# Patient Record
Sex: FEMALE | Race: WHITE | NOT HISPANIC OR LATINO | URBAN - METROPOLITAN AREA
[De-identification: names, ages, dates, MRNs, and addresses within clinical notes are randomized per-mention and may not be internally consistent; named-entity substitution may affect disease eponyms.]

---

## 2025-01-14 ENCOUNTER — TELEPHONE (OUTPATIENT)
Dept: PRIMARY CARE | Facility: CLINIC | Age: 85
End: 2025-01-14
Payer: MEDICARE

## 2025-01-14 ENCOUNTER — TELEPHONE (OUTPATIENT)
Dept: POST ACUTE CARE | Facility: EXTERNAL LOCATION | Age: 85
End: 2025-01-14
Payer: MEDICARE

## 2025-01-14 DIAGNOSIS — N39.0 E-COLI UTI: Primary | ICD-10-CM

## 2025-01-14 DIAGNOSIS — B96.20 E-COLI UTI: Primary | ICD-10-CM

## 2025-01-14 RX ORDER — CALCIUM CARBONATE 200(500)MG
2 TABLET,CHEWABLE ORAL 4 TIMES DAILY PRN
COMMUNITY

## 2025-01-14 RX ORDER — ASPIRIN 81 MG/1
81 TABLET ORAL DAILY
COMMUNITY

## 2025-01-14 RX ORDER — MELATONIN 5 MG
1 CAPSULE ORAL DAILY
COMMUNITY

## 2025-01-14 RX ORDER — DONEPEZIL HYDROCHLORIDE 5 MG/1
5 TABLET, FILM COATED ORAL DAILY
COMMUNITY

## 2025-01-14 RX ORDER — FERROUS SULFATE 325(65) MG
325 TABLET ORAL
COMMUNITY

## 2025-01-14 RX ORDER — CRANBERRY FRUIT 450 MG
1 TABLET ORAL DAILY
COMMUNITY

## 2025-01-14 RX ORDER — VENLAFAXINE 75 MG/1
75 TABLET ORAL DAILY
COMMUNITY

## 2025-01-14 RX ORDER — BISMUTH SUBSALICYLATE 262 MG
1 TABLET,CHEWABLE ORAL DAILY
COMMUNITY

## 2025-01-14 RX ORDER — SULFAMETHOXAZOLE AND TRIMETHOPRIM 800; 160 MG/1; MG/1
1 TABLET ORAL 2 TIMES DAILY
Qty: 14 TABLET | Refills: 0 | Status: SHIPPED | OUTPATIENT
Start: 2025-01-14 | End: 2025-01-21

## 2025-01-14 RX ORDER — PUMPKIN SEED EXTRACT/SOY GERM 300 MG
1 CAPSULE ORAL DAILY
COMMUNITY

## 2025-01-14 RX ORDER — FUROSEMIDE 20 MG/1
20 TABLET ORAL DAILY
COMMUNITY

## 2025-01-14 RX ORDER — CHOLECALCIFEROL (VITAMIN D3) 25 MCG
2000 TABLET ORAL DAILY
COMMUNITY

## 2025-01-14 RX ORDER — PSYLLIUM HUSK 0.4 G
1 CAPSULE ORAL 2 TIMES DAILY
COMMUNITY

## 2025-01-14 RX ORDER — TRAZODONE HYDROCHLORIDE 50 MG/1
50 TABLET ORAL NIGHTLY
COMMUNITY

## 2025-01-14 RX ORDER — SIMVASTATIN 20 MG/1
20 TABLET, FILM COATED ORAL NIGHTLY
COMMUNITY

## 2025-01-14 RX ORDER — POLYETHYLENE GLYCOL 3350, SODIUM CHLORIDE, SODIUM BICARBONATE, POTASSIUM CHLORIDE 420; 11.2; 5.72; 1.48 G/4L; G/4L; G/4L; G/4L
4000 POWDER, FOR SOLUTION ORAL DAILY PRN
COMMUNITY

## 2025-01-14 RX ORDER — ACETAMINOPHEN 325 MG/1
650 TABLET ORAL EVERY 6 HOURS PRN
COMMUNITY

## 2025-01-14 NOTE — TELEPHONE ENCOUNTER
Received faxed documents from [facility] along with request for patient to be seen by SHAWNEE Mcdermott NP at facility.   Please see patient when next at facility.

## 2025-01-14 NOTE — TELEPHONE ENCOUNTER
Urine culture reviewed with Violet ESCOTO at [facility]. Pt with UTI  with ecoli. Sensitivity reviewed. Will send Bactrim DS to [facility] pharmacy. Allergies and recent labs reviewed.

## 2025-01-16 ENCOUNTER — NURSING HOME VISIT (OUTPATIENT)
Dept: POST ACUTE CARE | Facility: EXTERNAL LOCATION | Age: 85
End: 2025-01-16
Payer: MEDICARE

## 2025-01-16 DIAGNOSIS — F01.50 VASCULAR DEMENTIA WITHOUT BEHAVIORAL DISTURBANCE, PSYCHOTIC DISTURBANCE, MOOD DISTURBANCE, OR ANXIETY, UNSPECIFIED DEMENTIA SEVERITY (MULTI): ICD-10-CM

## 2025-01-16 DIAGNOSIS — F41.1 GENERALIZED ANXIETY DISORDER: ICD-10-CM

## 2025-01-16 DIAGNOSIS — D50.9 IRON DEFICIENCY ANEMIA, UNSPECIFIED IRON DEFICIENCY ANEMIA TYPE: ICD-10-CM

## 2025-01-16 DIAGNOSIS — Z78.9 LIVING IN ASSISTED LIVING: ICD-10-CM

## 2025-01-16 DIAGNOSIS — E78.2 MIXED HYPERLIPIDEMIA: Primary | ICD-10-CM

## 2025-01-16 PROCEDURE — 99344 HOME/RES VST NEW MOD MDM 60: CPT

## 2025-01-16 ASSESSMENT — PAIN SCALES - GENERAL: PAINLEVEL_OUTOF10: 0-NO PAIN

## 2025-01-16 NOTE — LETTER
"Patient: Gifty Zavala  : 1940    Encounter Date: 2025    Subjective  Patient ID: Gifty Zavala is a 84 y.o. female who is assisted living/ home patient being seen at [facility],  and evaluated to Establish Care.     HPI   Pt visited in assisted living to establish care. Pt with a hx of hyperlipidemia, iron deficiency anemia, dementia, and anxiety. Pt oriented x1, unable to state , denies pain and is comfortable. Pt denies changes in vision, and hearing. Appears to hear normal tone of voice. Pt states appetite is good, and denies difficulty with chewing and swallowing. Pt goes to dining room for all meals. Pt denies headaches, dizziness, congestion, cough and runny nose. Pt with a hx of skin cancer.    Pt denies chest pain and sob at rest/exertion. Pt denies voiding symptoms and constipation. However, pt with a positive urine culture with E-coli. Pt was started on Bactrim, and appears to be doing well, no adverse effects, or side effects have been noted. Pt ambulates independently. No recent falls reported. Denies sleep disturbances. Pt follows mental health provider for anxiety/depression. Pt able to answer yes and no questions regarding health. States she moved from [place] when she was younger. , and has 2 children. Pt repeats conversation, or asks same questions over at times. Pt states \"I really am doing good\". HPI limited due to cognition secondary to dementia. Additional collateral information obtained from nursing, and daughter Татьяна.     Current Outpatient Medications on File Prior to Visit   Medication Sig Dispense Refill   • acetaminophen (Tylenol) 325 mg tablet Take 2 tablets (650 mg) by mouth every 6 hours if needed for mild pain (1 - 3).     • aspirin 81 mg EC tablet Take 1 tablet (81 mg) by mouth once daily.     • calcium carbonate (Tums) 200 mg calcium chewable tablet Chew 2 tablets (1,000 mg) 4 times a day as needed for indigestion or heartburn (1-2 tablets).     • " calcium carbonate-vitamin D3 500 mg-5 mcg (200 unit) tablet Take 1 tablet by mouth 2 times a day.     • cholecalciferol (Vitamin D3) 25 MCG (1000 UT) tablet Take 2 tablets (2,000 Units) by mouth once daily.     • cranberry fruit (cranberry) 450 mg tablet Take 1 capsule by mouth once daily. At bedtime     • d-mannose (AZO D-Mannose) 500 mg capsule Take 1 capsule (500 mg) by mouth once daily. At bedtime     • donepezil (Aricept) 5 mg tablet Take 1 tablet (5 mg) by mouth once daily. After breakfast     • ferrous sulfate, 325 mg ferrous sulfate, tablet Take 1 tablet (325 mg) by mouth once daily with breakfast.     • furosemide (Lasix) 20 mg tablet Take 1 tablet (20 mg) by mouth once daily.     • melatonin 5 mg capsule Take 1 capsule (5 mg) by mouth once daily. At bedtime     • multivitamin tablet Take 1 tablet by mouth once daily.     • polyethylene glycol-electrolytes (Nulytely) 420 gram solution Take 4,000 mL by mouth once daily as needed. For constipation     • simvastatin (Zocor) 20 mg tablet Take 1 tablet (20 mg) by mouth once daily at bedtime.     • sulfamethoxazole-trimethoprim (Bactrim DS) 800-160 mg tablet Take 1 tablet by mouth 2 times a day for 7 days. 14 tablet 0   • traZODone (Desyrel) 50 mg tablet Take 1 tablet (50 mg) by mouth once daily at bedtime.     • venlafaxine (Effexor) 75 mg tablet Take 1 tablet (75 mg) by mouth once daily.       No current facility-administered medications on file prior to visit.      Review of Systems   Constitutional:  Negative for chills, fatigue and fever.   HENT:  Negative for congestion and rhinorrhea.    Respiratory:  Negative for cough and shortness of breath.    Cardiovascular:  Negative for chest pain and leg swelling.   Gastrointestinal:  Negative for constipation and diarrhea.   Genitourinary:  Negative for frequency.   Musculoskeletal:  Negative for arthralgias and gait problem.   Neurological:  Negative for weakness and headaches.   Psychiatric/Behavioral:  Negative  for sleep disturbance. The patient is not nervous/anxious.        Objective  /68 (BP Location: Left arm, Patient Position: Sitting, BP Cuff Size: Adult)   Pulse 72   Resp 16   SpO2 97%     *Outside labs reviewed from [facility] on 11/23/24: CMP, CBC, Ferritin, Iron and TIBC, and Lipid Panel.    Physical Exam  Constitutional:       General: She is awake.      Appearance: Normal appearance.   HENT:      Head: Normocephalic.      Nose: No congestion or rhinorrhea.      Mouth/Throat:      Mouth: Mucous membranes are moist.   Eyes:      Conjunctiva/sclera: Conjunctivae normal.   Cardiovascular:      Rate and Rhythm: Normal rate and regular rhythm.      Pulses: Normal pulses.      Heart sounds: Normal heart sounds.   Pulmonary:      Effort: Pulmonary effort is normal. No respiratory distress.      Breath sounds: Normal breath sounds. No wheezing or rhonchi.   Abdominal:      General: Bowel sounds are normal.      Palpations: Abdomen is soft.   Musculoskeletal:         General: Normal range of motion.      Cervical back: Normal range of motion.      Right lower leg: No edema.      Left lower leg: No edema.   Skin:     General: Skin is warm.      Capillary Refill: Capillary refill takes less than 2 seconds.   Neurological:      Mental Status: She is alert. Mental status is at baseline.      Comments: Oriented x1   Psychiatric:         Attention and Perception: Attention normal.         Behavior: Behavior is cooperative.         Cognition and Memory: Cognition is impaired. Memory is impaired.         Assessment/Plan  Diagnoses and all orders for this visit:  Mixed hyperlipidemia  Comments:  Continue simvastatin.  Generalized anxiety disorder  Comments:  Continue to follow psych for symptom and medication mgmt.  Vascular dementia without behavioral disturbance, psychotic disturbance, mood disturbance, or anxiety, unspecified dementia severity (Multi)  Comments:  Continue to monitor for cognitive decline. Continue  donepezil.  Iron deficiency anemia, unspecified iron deficiency anemia type  Comments:  Continue ferrous sulfate. Recheck iron in 3 months.  Living in assisted living  Comments:  Medical and facility chart reviewed, medications reconciled and collaboration with nursing.            Electronically Signed By: CARLO Travis-CNP   1/17/25  7:04 PM

## 2025-01-17 VITALS
HEART RATE: 72 BPM | RESPIRATION RATE: 16 BRPM | OXYGEN SATURATION: 97 % | DIASTOLIC BLOOD PRESSURE: 68 MMHG | SYSTOLIC BLOOD PRESSURE: 120 MMHG

## 2025-01-17 PROBLEM — G47.09 OTHER INSOMNIA: Status: ACTIVE | Noted: 2024-08-28

## 2025-01-17 PROBLEM — E78.5 HYPERLIPIDEMIA: Status: ACTIVE | Noted: 2025-01-17

## 2025-01-17 PROBLEM — F03.90 DEMENTIA: Status: ACTIVE | Noted: 2024-08-28

## 2025-01-17 PROBLEM — F41.1 GENERALIZED ANXIETY DISORDER: Status: ACTIVE | Noted: 2024-08-28

## 2025-01-17 PROBLEM — F33.40 RECURRENT MAJOR DEPRESSIVE DISORDER, IN REMISSION (CMS-HCC): Chronic | Status: ACTIVE | Noted: 2024-08-28

## 2025-01-17 ASSESSMENT — ENCOUNTER SYMPTOMS
CONSTIPATION: 0
DIARRHEA: 0
HEADACHES: 0
SHORTNESS OF BREATH: 0
FREQUENCY: 0
WEAKNESS: 0
CHILLS: 0
RHINORRHEA: 0
ARTHRALGIAS: 0
COUGH: 0
FEVER: 0
FATIGUE: 0
NERVOUS/ANXIOUS: 0
SLEEP DISTURBANCE: 0

## 2025-01-17 NOTE — PROGRESS NOTES
"Subjective   Patient ID: Gifty Zavala is a 84 y.o. female who is assisted living/ home patient being seen at [facility],  and evaluated to Establish Care.     HPI   Pt visited in assisted living to establish care. Pt with a hx of hyperlipidemia, iron deficiency anemia, dementia, and anxiety. Pt oriented x1, unable to state , denies pain and is comfortable. Pt denies changes in vision, and hearing. Appears to hear normal tone of voice. Pt states appetite is good, and denies difficulty with chewing and swallowing. Pt goes to dining room for all meals. Pt denies headaches, dizziness, congestion, cough and runny nose. Pt with a hx of skin cancer.    Pt denies chest pain and sob at rest/exertion. Pt denies voiding symptoms and constipation. However, pt with a positive urine culture with E-coli. Pt was started on Bactrim, and appears to be doing well, no adverse effects, or side effects have been noted. Pt ambulates independently. No recent falls reported. Denies sleep disturbances. Pt follows mental health provider for anxiety/depression. Pt able to answer yes and no questions regarding health. States she moved from [place] when she was younger. , and has 2 children. Pt repeats conversation, or asks same questions over at times. Pt states \"I really am doing good\". HPI limited due to cognition secondary to dementia. Additional collateral information obtained from nursing, and daughter Татьяна.     Current Outpatient Medications on File Prior to Visit   Medication Sig Dispense Refill    acetaminophen (Tylenol) 325 mg tablet Take 2 tablets (650 mg) by mouth every 6 hours if needed for mild pain (1 - 3).      aspirin 81 mg EC tablet Take 1 tablet (81 mg) by mouth once daily.      calcium carbonate (Tums) 200 mg calcium chewable tablet Chew 2 tablets (1,000 mg) 4 times a day as needed for indigestion or heartburn (1-2 tablets).      calcium carbonate-vitamin D3 500 mg-5 mcg (200 unit) tablet Take 1 tablet by " mouth 2 times a day.      cholecalciferol (Vitamin D3) 25 MCG (1000 UT) tablet Take 2 tablets (2,000 Units) by mouth once daily.      cranberry fruit (cranberry) 450 mg tablet Take 1 capsule by mouth once daily. At bedtime      d-mannose (AZO D-Mannose) 500 mg capsule Take 1 capsule (500 mg) by mouth once daily. At bedtime      donepezil (Aricept) 5 mg tablet Take 1 tablet (5 mg) by mouth once daily. After breakfast      ferrous sulfate, 325 mg ferrous sulfate, tablet Take 1 tablet (325 mg) by mouth once daily with breakfast.      furosemide (Lasix) 20 mg tablet Take 1 tablet (20 mg) by mouth once daily.      melatonin 5 mg capsule Take 1 capsule (5 mg) by mouth once daily. At bedtime      multivitamin tablet Take 1 tablet by mouth once daily.      polyethylene glycol-electrolytes (Nulytely) 420 gram solution Take 4,000 mL by mouth once daily as needed. For constipation      simvastatin (Zocor) 20 mg tablet Take 1 tablet (20 mg) by mouth once daily at bedtime.      sulfamethoxazole-trimethoprim (Bactrim DS) 800-160 mg tablet Take 1 tablet by mouth 2 times a day for 7 days. 14 tablet 0    traZODone (Desyrel) 50 mg tablet Take 1 tablet (50 mg) by mouth once daily at bedtime.      venlafaxine (Effexor) 75 mg tablet Take 1 tablet (75 mg) by mouth once daily.       No current facility-administered medications on file prior to visit.      Review of Systems   Constitutional:  Negative for chills, fatigue and fever.   HENT:  Negative for congestion and rhinorrhea.    Respiratory:  Negative for cough and shortness of breath.    Cardiovascular:  Negative for chest pain and leg swelling.   Gastrointestinal:  Negative for constipation and diarrhea.   Genitourinary:  Negative for frequency.   Musculoskeletal:  Negative for arthralgias and gait problem.   Neurological:  Negative for weakness and headaches.   Psychiatric/Behavioral:  Negative for sleep disturbance. The patient is not nervous/anxious.        Objective   /68  (BP Location: Left arm, Patient Position: Sitting, BP Cuff Size: Adult)   Pulse 72   Resp 16   SpO2 97%     *Outside labs reviewed from [facility] on 11/23/24: CMP, CBC, Ferritin, Iron and TIBC, and Lipid Panel.    Physical Exam  Constitutional:       General: She is awake.      Appearance: Normal appearance.   HENT:      Head: Normocephalic.      Nose: No congestion or rhinorrhea.      Mouth/Throat:      Mouth: Mucous membranes are moist.   Eyes:      Conjunctiva/sclera: Conjunctivae normal.   Cardiovascular:      Rate and Rhythm: Normal rate and regular rhythm.      Pulses: Normal pulses.      Heart sounds: Normal heart sounds.   Pulmonary:      Effort: Pulmonary effort is normal. No respiratory distress.      Breath sounds: Normal breath sounds. No wheezing or rhonchi.   Abdominal:      General: Bowel sounds are normal.      Palpations: Abdomen is soft.   Musculoskeletal:         General: Normal range of motion.      Cervical back: Normal range of motion.      Right lower leg: No edema.      Left lower leg: No edema.   Skin:     General: Skin is warm.      Capillary Refill: Capillary refill takes less than 2 seconds.   Neurological:      Mental Status: She is alert. Mental status is at baseline.      Comments: Oriented x1   Psychiatric:         Attention and Perception: Attention normal.         Behavior: Behavior is cooperative.         Cognition and Memory: Cognition is impaired. Memory is impaired.         Assessment/Plan   Diagnoses and all orders for this visit:  Mixed hyperlipidemia  Comments:  Continue simvastatin.  Generalized anxiety disorder  Comments:  Continue to follow psych for symptom and medication mgmt.  Vascular dementia without behavioral disturbance, psychotic disturbance, mood disturbance, or anxiety, unspecified dementia severity (Multi)  Comments:  Continue to monitor for cognitive decline. Continue donepezil.  Iron deficiency anemia, unspecified iron deficiency anemia type  Comments:  Continue  ferrous sulfate. Recheck iron in 3 months.  Living in assisted living  Comments:  Medical and facility chart reviewed, medications reconciled and collaboration with nursing.

## 2025-03-18 DIAGNOSIS — Z78.9 TAKES DIETARY SUPPLEMENTS: ICD-10-CM

## 2025-03-18 DIAGNOSIS — E78.2 MIXED HYPERLIPIDEMIA: Primary | ICD-10-CM

## 2025-03-18 DIAGNOSIS — I25.10 CARDIOVASCULAR DISEASE: ICD-10-CM

## 2025-03-18 DIAGNOSIS — R03.0 ELEVATED BP WITHOUT DIAGNOSIS OF HYPERTENSION: ICD-10-CM

## 2025-03-18 RX ORDER — SIMVASTATIN 20 MG/1
20 TABLET, FILM COATED ORAL NIGHTLY
Qty: 90 TABLET | Refills: 3 | Status: SHIPPED | OUTPATIENT
Start: 2025-03-18

## 2025-03-18 RX ORDER — FUROSEMIDE 20 MG/1
20 TABLET ORAL DAILY
Qty: 90 TABLET | Refills: 3 | Status: SHIPPED | OUTPATIENT
Start: 2025-03-18

## 2025-03-18 RX ORDER — ASPIRIN 81 MG/1
81 TABLET ORAL DAILY
Qty: 90 TABLET | Refills: 3 | Status: SHIPPED | OUTPATIENT
Start: 2025-03-18

## 2025-03-18 RX ORDER — BISMUTH SUBSALICYLATE 262 MG
1 TABLET,CHEWABLE ORAL DAILY
Qty: 90 TABLET | Refills: 3 | Status: SHIPPED | OUTPATIENT
Start: 2025-03-18

## 2025-03-18 RX ORDER — PSYLLIUM HUSK 0.4 G
1 CAPSULE ORAL 2 TIMES DAILY
Qty: 180 TABLET | Refills: 3 | Status: SHIPPED | OUTPATIENT
Start: 2025-03-18

## 2025-03-18 NOTE — TELEPHONE ENCOUNTER
Violet ESCOTO calls, pt needing refills of medication. Explained could fill all but Trazodone and Melatonin, these were prescribed by Psych NP Holli Sanchez.

## 2025-04-10 ENCOUNTER — NURSING HOME VISIT (OUTPATIENT)
Dept: POST ACUTE CARE | Facility: EXTERNAL LOCATION | Age: 85
End: 2025-04-10
Payer: MEDICARE

## 2025-04-10 DIAGNOSIS — Z78.9 LIVING IN ASSISTED LIVING: ICD-10-CM

## 2025-04-10 DIAGNOSIS — F41.1 GENERALIZED ANXIETY DISORDER: ICD-10-CM

## 2025-04-10 DIAGNOSIS — E78.2 MIXED HYPERLIPIDEMIA: ICD-10-CM

## 2025-04-10 DIAGNOSIS — F01.50 VASCULAR DEMENTIA WITHOUT BEHAVIORAL DISTURBANCE, PSYCHOTIC DISTURBANCE, MOOD DISTURBANCE, OR ANXIETY, UNSPECIFIED DEMENTIA SEVERITY (MULTI): Primary | ICD-10-CM

## 2025-04-10 DIAGNOSIS — D50.9 IRON DEFICIENCY ANEMIA, UNSPECIFIED IRON DEFICIENCY ANEMIA TYPE: ICD-10-CM

## 2025-04-10 PROCEDURE — 99349 HOME/RES VST EST MOD MDM 40: CPT

## 2025-04-10 ASSESSMENT — PAIN SCALES - GENERAL: PAINLEVEL_OUTOF10: 0-NO PAIN

## 2025-04-10 NOTE — LETTER
"Patient: Gifty Zavala  : 1940    Encounter Date: 04/10/2025    Subjective  Patient ID: Gifty Zavala is a 84 y.o. female who is assisted living/ home patient being seen at [facility], and evaluated for Routine AL Visit.     HPI   Pt visited in apartment of assisted living where she resides with cat named Robert. Pt oriented x1, unable to state , denies pain and is comfortable. Pt denies changes in vision, and hearing. Appears to hear normal tone of voice. States appetite is good, and denies difficulty with chewing and swallowing. Pt goes to dining room for all meals. Pt denies headaches, dizziness, congestion, cough and runny nose.     Pt denies chest pain and sob at rest/exertion. Pt denies voiding symptoms and constipation. Pt ambulates independently. No recent falls reported. Denies sleep disturbances. Pt follows mental health provider for anxiety/depression. Pt answer questions appropriately at this time and states \"yes dear, and no dear\". Pt enjoys participating in activities at assisted living. Pt states \"I do pretty good, I feel good\". Pt with no health concerns at this time. HPI limited due to cognition secondary to dementia. Additional collateral information obtained from nursing.       Current Outpatient Medications on File Prior to Visit   Medication Sig Dispense Refill   • clobetasol (Temovate) 0.05 % cream Apply 1 Application topically 2 times a day.     • acetaminophen (Tylenol) 325 mg tablet Take 2 tablets (650 mg) by mouth every 6 hours if needed for mild pain (1 - 3).     • aspirin 81 mg EC tablet Take 1 tablet (81 mg) by mouth once daily. 90 tablet 3   • calcium carbonate (Tums) 200 mg calcium chewable tablet Chew 2 tablets (1,000 mg) 4 times a day as needed for indigestion or heartburn (1-2 tablets).     • calcium carbonate-vitamin D3 500 mg-5 mcg (200 unit) tablet Take 1 tablet by mouth 2 times a day. 180 tablet 3   • cholecalciferol (Vitamin D3) 25 MCG (1000 UT) tablet Take 2 " tablets (2,000 Units) by mouth once daily.     • cranberry fruit (cranberry) 450 mg tablet Take 1 capsule by mouth once daily. At bedtime     • d-mannose (AZO D-Mannose) 500 mg capsule Take 1 capsule (500 mg) by mouth once daily. At bedtime     • donepezil (Aricept) 5 mg tablet Take 1 tablet (5 mg) by mouth once daily. After breakfast     • ferrous sulfate, 325 mg ferrous sulfate, tablet Take 1 tablet (325 mg) by mouth once daily with breakfast.     • furosemide (Lasix) 20 mg tablet Take 1 tablet (20 mg) by mouth once daily. 90 tablet 3   • melatonin 5 mg capsule Take 1 capsule (5 mg) by mouth once daily. At bedtime     • multivitamin tablet Take 1 tablet by mouth once daily. 90 tablet 3   • polyethylene glycol-electrolytes (Nulytely) 420 gram solution Take 4,000 mL by mouth once daily as needed. For constipation     • simvastatin (Zocor) 20 mg tablet Take 1 tablet (20 mg) by mouth once daily at bedtime. 90 tablet 3   • traZODone (Desyrel) 50 mg tablet Take 1 tablet (50 mg) by mouth once daily at bedtime.     • venlafaxine (Effexor) 75 mg tablet Take 1 tablet (75 mg) by mouth once daily.       No current facility-administered medications on file prior to visit.      Review of Systems   Constitutional:  Negative for chills, fatigue and fever.   HENT:  Negative for congestion, rhinorrhea and sore throat.    Respiratory:  Negative for cough and shortness of breath.    Cardiovascular:  Negative for chest pain and leg swelling.   Gastrointestinal:  Negative for constipation, diarrhea, nausea and vomiting.   Genitourinary:  Negative for frequency.   Musculoskeletal:  Negative for arthralgias and gait problem.   Neurological:  Negative for dizziness, weakness and headaches.   Psychiatric/Behavioral:  Negative for sleep disturbance. The patient is not nervous/anxious.        Objective  /88   Pulse 82   Resp 16   SpO2 96%     *CMP, CBC, Lipid Panel, Ferritin, Iron and TIBC reviewed from 11/23/2024.     Physical  Exam  Constitutional:       General: She is awake.      Appearance: Normal appearance.   HENT:      Head: Normocephalic.      Nose: No congestion or rhinorrhea.      Mouth/Throat:      Mouth: Mucous membranes are moist.   Eyes:      Conjunctiva/sclera: Conjunctivae normal.   Cardiovascular:      Rate and Rhythm: Normal rate and regular rhythm.      Pulses: Normal pulses.      Heart sounds: Normal heart sounds.   Pulmonary:      Effort: Pulmonary effort is normal. No respiratory distress.      Breath sounds: Normal breath sounds. No wheezing or rhonchi.   Abdominal:      General: Bowel sounds are normal.      Palpations: Abdomen is soft.   Musculoskeletal:         General: Normal range of motion.      Cervical back: Normal range of motion.      Right lower leg: No edema.      Left lower leg: No edema.   Skin:     General: Skin is warm.      Capillary Refill: Capillary refill takes less than 2 seconds.   Neurological:      Mental Status: She is alert. Mental status is at baseline.      Comments: Oriented x1   Psychiatric:         Attention and Perception: Attention normal.         Mood and Affect: Mood normal.         Speech: Speech normal.         Behavior: Behavior is cooperative.         Cognition and Memory: Cognition is impaired. Memory is impaired.         Assessment/Plan  Diagnoses and all orders for this visit:  Vascular dementia without behavioral disturbance, psychotic disturbance, mood disturbance, or anxiety, unspecified dementia severity (Multi)  Comments:  Continue to monitor for cognitive decline.  Iron deficiency anemia, unspecified iron deficiency anemia type  Comments:  Continue ferrous sulfate. Obtain CBC, Iron, and TIBC on 4/17/25.  Mixed hyperlipidemia  Comments:  Continue simvastatin. Lipid panel reviewed from 11/2024.  Generalized anxiety disorder  Comments:  Continue to follow psych for medication and symptom management.  Living in assisted living  Comments:  Medical and facility chart  reviewed, medications reconciled, and collaboration with nursing. Orders written for BMP, CBC, Iron/TIBC, and Vit D on 4/17/25.            Electronically Signed By: CARLO Travis-CNP   4/15/25  1:54 PM

## 2025-04-15 VITALS
HEART RATE: 82 BPM | OXYGEN SATURATION: 96 % | RESPIRATION RATE: 16 BRPM | DIASTOLIC BLOOD PRESSURE: 88 MMHG | SYSTOLIC BLOOD PRESSURE: 128 MMHG

## 2025-04-15 RX ORDER — CLOBETASOL PROPIONATE 0.5 MG/G
1 CREAM TOPICAL 2 TIMES DAILY
COMMUNITY
Start: 2024-03-04

## 2025-04-15 ASSESSMENT — ENCOUNTER SYMPTOMS
CONSTIPATION: 0
SLEEP DISTURBANCE: 0
CHILLS: 0
COUGH: 0
VOMITING: 0
FREQUENCY: 0
FEVER: 0
DIZZINESS: 0
WEAKNESS: 0
DIARRHEA: 0
SHORTNESS OF BREATH: 0
ARTHRALGIAS: 0
HEADACHES: 0
NAUSEA: 0
SORE THROAT: 0
FATIGUE: 0
RHINORRHEA: 0
NERVOUS/ANXIOUS: 0

## 2025-04-15 NOTE — PROGRESS NOTES
"Subjective   Patient ID: Gifty Zavala is a 84 y.o. female who is assisted living/ home patient being seen at [facility], and evaluated for Routine AL Visit.     HPI   Pt visited in apartment of assisted living where she resides with cat named Robert. Pt oriented x1, unable to state , denies pain and is comfortable. Pt denies changes in vision, and hearing. Appears to hear normal tone of voice. States appetite is good, and denies difficulty with chewing and swallowing. Pt goes to dining room for all meals. Pt denies headaches, dizziness, congestion, cough and runny nose.     Pt denies chest pain and sob at rest/exertion. Pt denies voiding symptoms and constipation. Pt ambulates independently. No recent falls reported. Denies sleep disturbances. Pt follows mental health provider for anxiety/depression. Pt answer questions appropriately at this time and states \"yes dear, and no dear\". Pt enjoys participating in activities at assisted living. Pt states \"I do pretty good, I feel good\". Pt with no health concerns at this time. HPI limited due to cognition secondary to dementia. Additional collateral information obtained from nursing.       Current Outpatient Medications on File Prior to Visit   Medication Sig Dispense Refill    clobetasol (Temovate) 0.05 % cream Apply 1 Application topically 2 times a day.      acetaminophen (Tylenol) 325 mg tablet Take 2 tablets (650 mg) by mouth every 6 hours if needed for mild pain (1 - 3).      aspirin 81 mg EC tablet Take 1 tablet (81 mg) by mouth once daily. 90 tablet 3    calcium carbonate (Tums) 200 mg calcium chewable tablet Chew 2 tablets (1,000 mg) 4 times a day as needed for indigestion or heartburn (1-2 tablets).      calcium carbonate-vitamin D3 500 mg-5 mcg (200 unit) tablet Take 1 tablet by mouth 2 times a day. 180 tablet 3    cholecalciferol (Vitamin D3) 25 MCG (1000 UT) tablet Take 2 tablets (2,000 Units) by mouth once daily.      cranberry fruit (cranberry) 450 " mg tablet Take 1 capsule by mouth once daily. At bedtime      d-mannose (AZO D-Mannose) 500 mg capsule Take 1 capsule (500 mg) by mouth once daily. At bedtime      donepezil (Aricept) 5 mg tablet Take 1 tablet (5 mg) by mouth once daily. After breakfast      ferrous sulfate, 325 mg ferrous sulfate, tablet Take 1 tablet (325 mg) by mouth once daily with breakfast.      furosemide (Lasix) 20 mg tablet Take 1 tablet (20 mg) by mouth once daily. 90 tablet 3    melatonin 5 mg capsule Take 1 capsule (5 mg) by mouth once daily. At bedtime      multivitamin tablet Take 1 tablet by mouth once daily. 90 tablet 3    polyethylene glycol-electrolytes (Nulytely) 420 gram solution Take 4,000 mL by mouth once daily as needed. For constipation      simvastatin (Zocor) 20 mg tablet Take 1 tablet (20 mg) by mouth once daily at bedtime. 90 tablet 3    traZODone (Desyrel) 50 mg tablet Take 1 tablet (50 mg) by mouth once daily at bedtime.      venlafaxine (Effexor) 75 mg tablet Take 1 tablet (75 mg) by mouth once daily.       No current facility-administered medications on file prior to visit.      Review of Systems   Constitutional:  Negative for chills, fatigue and fever.   HENT:  Negative for congestion, rhinorrhea and sore throat.    Respiratory:  Negative for cough and shortness of breath.    Cardiovascular:  Negative for chest pain and leg swelling.   Gastrointestinal:  Negative for constipation, diarrhea, nausea and vomiting.   Genitourinary:  Negative for frequency.   Musculoskeletal:  Negative for arthralgias and gait problem.   Neurological:  Negative for dizziness, weakness and headaches.   Psychiatric/Behavioral:  Negative for sleep disturbance. The patient is not nervous/anxious.        Objective   /88   Pulse 82   Resp 16   SpO2 96%     *CMP, CBC, Lipid Panel, Ferritin, Iron and TIBC reviewed from 11/23/2024.     Physical Exam  Constitutional:       General: She is awake.      Appearance: Normal appearance.   HENT:       Head: Normocephalic.      Nose: No congestion or rhinorrhea.      Mouth/Throat:      Mouth: Mucous membranes are moist.   Eyes:      Conjunctiva/sclera: Conjunctivae normal.   Cardiovascular:      Rate and Rhythm: Normal rate and regular rhythm.      Pulses: Normal pulses.      Heart sounds: Normal heart sounds.   Pulmonary:      Effort: Pulmonary effort is normal. No respiratory distress.      Breath sounds: Normal breath sounds. No wheezing or rhonchi.   Abdominal:      General: Bowel sounds are normal.      Palpations: Abdomen is soft.   Musculoskeletal:         General: Normal range of motion.      Cervical back: Normal range of motion.      Right lower leg: No edema.      Left lower leg: No edema.   Skin:     General: Skin is warm.      Capillary Refill: Capillary refill takes less than 2 seconds.   Neurological:      Mental Status: She is alert. Mental status is at baseline.      Comments: Oriented x1   Psychiatric:         Attention and Perception: Attention normal.         Mood and Affect: Mood normal.         Speech: Speech normal.         Behavior: Behavior is cooperative.         Cognition and Memory: Cognition is impaired. Memory is impaired.         Assessment/Plan   Diagnoses and all orders for this visit:  Vascular dementia without behavioral disturbance, psychotic disturbance, mood disturbance, or anxiety, unspecified dementia severity (Multi)  Comments:  Continue to monitor for cognitive decline.  Iron deficiency anemia, unspecified iron deficiency anemia type  Comments:  Continue ferrous sulfate. Obtain CBC, Iron, and TIBC on 4/17/25.  Mixed hyperlipidemia  Comments:  Continue simvastatin. Lipid panel reviewed from 11/2024.  Generalized anxiety disorder  Comments:  Continue to follow psych for medication and symptom management.  Living in assisted living  Comments:  Medical and facility chart reviewed, medications reconciled, and collaboration with nursing. Orders written for BMP, CBC,  Iron/TIBC, and Vit D on 4/17/25.

## 2025-04-24 DIAGNOSIS — F51.01 PRIMARY INSOMNIA: Primary | ICD-10-CM

## 2025-04-24 RX ORDER — TRAZODONE HYDROCHLORIDE 50 MG/1
50 TABLET ORAL NIGHTLY
Qty: 90 TABLET | Refills: 1 | Status: SHIPPED | OUTPATIENT
Start: 2025-04-24

## 2025-04-24 NOTE — TELEPHONE ENCOUNTER
Facility pharmacy requesting refill on Trazodone 50mg po once daily at bedtime. Med sent to [facility].

## 2025-05-27 DIAGNOSIS — E55.9 VITAMIN D DEFICIENCY: Primary | ICD-10-CM

## 2025-05-28 RX ORDER — CHOLECALCIFEROL (VITAMIN D3) 25 MCG
50 TABLET ORAL DAILY
Qty: 30 TABLET | Refills: 1 | Status: SHIPPED | OUTPATIENT
Start: 2025-05-28

## 2025-05-30 RX ORDER — CHOLECALCIFEROL (VITAMIN D3) 50 MCG
TABLET ORAL
Qty: 30 TABLET | Refills: 3 | OUTPATIENT
Start: 2025-05-30

## 2025-07-23 ENCOUNTER — NURSING HOME VISIT (OUTPATIENT)
Dept: POST ACUTE CARE | Facility: EXTERNAL LOCATION | Age: 85
End: 2025-07-23
Payer: MEDICARE

## 2025-07-23 DIAGNOSIS — E78.2 MIXED HYPERLIPIDEMIA: Primary | ICD-10-CM

## 2025-07-23 DIAGNOSIS — E55.9 VITAMIN D DEFICIENCY: ICD-10-CM

## 2025-07-23 DIAGNOSIS — F01.50 VASCULAR DEMENTIA WITHOUT BEHAVIORAL DISTURBANCE, PSYCHOTIC DISTURBANCE, MOOD DISTURBANCE, OR ANXIETY, UNSPECIFIED DEMENTIA SEVERITY (MULTI): ICD-10-CM

## 2025-07-23 DIAGNOSIS — Z78.9 LIVING IN ASSISTED LIVING: ICD-10-CM

## 2025-07-23 DIAGNOSIS — F41.1 GENERALIZED ANXIETY DISORDER: ICD-10-CM

## 2025-07-23 PROCEDURE — 99349 HOME/RES VST EST MOD MDM 40: CPT

## 2025-07-23 ASSESSMENT — PAIN SCALES - GENERAL: PAINLEVEL_OUTOF10: 0-NO PAIN

## 2025-07-23 NOTE — LETTER
"Patient: Gifty Zavala  : 1940    Encounter Date: 2025    Subjective  Patient ID: Gifty Zavala is a 84 y.o. female who is assisted living/ home patient being seen Dorcas Wallace, and evaluated for a Routine AL Visit.     HPI     Pt visited in assisted living. Pt oriented x1, unable to state , denies pain and is comfortable. Pt denies changes in vision, and hearing. Appears to hear normal tone of voice. States appetite is good, and denies difficulty with chewing and swallowing. Pt goes to dining room for all meals. Pt unable to recall what was eaten today for breakfast. Pt denies headaches, dizziness, congestion, cough, indigestion, and runny nose.     Denies chest tenderness and sob at rest/exertion. Pt with trace edema to lower extremities. Pt states \"my legs always have looked like this. I try to keep them up\". Pt not interested in compression stocking. Denies voiding symptoms and constipation. UA was obtained due to nursing noticing increased confusion, results were negative. Pt states \"why do they think I was confused because I stand around? I'm bored that's why I might look confused when I'm standing. There isn't much to do\". Pt ambulates independently, and no recent falls reported. Denies sleep disturbances. Pt follows mental health provider for anxiety/depression.  Pt with no health concerns at this time. HPI limited due to cognition secondary to dementia. Additional collateral information obtained from nursing. TC to daughter to update on visit.     Medications Ordered Prior to Encounter[1]     Review of Systems   Constitutional:  Negative for chills, fatigue and fever.   HENT:  Negative for congestion, rhinorrhea and sore throat.    Respiratory:  Negative for cough and shortness of breath.    Cardiovascular:  Negative for chest pain and leg swelling.   Gastrointestinal:  Negative for constipation, diarrhea, nausea and vomiting.   Genitourinary:  Negative for frequency.   Musculoskeletal:  " Negative for arthralgias and gait problem.   Neurological:  Negative for dizziness, weakness and headaches.   Psychiatric/Behavioral:  Negative for sleep disturbance. The patient is not nervous/anxious.        Objective  /64   Pulse 71   Resp 16   Wt 63.8 kg (140 lb 9.6 oz)   SpO2 95%     Physical Exam  Constitutional:       General: She is awake. She is not in acute distress.     Appearance: Normal appearance. She is not ill-appearing.   HENT:      Head: Normocephalic.      Nose: No congestion or rhinorrhea.      Mouth/Throat:      Mouth: Mucous membranes are moist.     Eyes:      Conjunctiva/sclera: Conjunctivae normal.       Cardiovascular:      Rate and Rhythm: Normal rate and regular rhythm.      Pulses: Normal pulses.      Heart sounds: Normal heart sounds.   Pulmonary:      Effort: Pulmonary effort is normal. No respiratory distress.      Breath sounds: Normal breath sounds. No wheezing or rhonchi.   Abdominal:      General: Bowel sounds are normal.      Palpations: Abdomen is soft.     Musculoskeletal:         General: Normal range of motion.      Cervical back: Normal range of motion.      Right lower leg: Edema present.      Left lower leg: Edema present.      Comments: Trace BLE edema     Skin:     General: Skin is warm.      Capillary Refill: Capillary refill takes less than 2 seconds.     Neurological:      Mental Status: She is alert. Mental status is at baseline.      Comments: Oriented x1   Psychiatric:         Attention and Perception: Attention normal.         Mood and Affect: Mood normal.         Speech: Speech normal.         Behavior: Behavior is cooperative.         Cognition and Memory: Cognition is impaired. Memory is impaired.         Assessment/Plan  Diagnoses and all orders for this visit:  Mixed hyperlipidemia  Comments:  Continue simvastatin.  Vascular dementia without behavioral disturbance, psychotic disturbance, mood disturbance, or anxiety, unspecified dementia severity  (Multi)  Comments:  Continue to follow Holli Sanchez NP for symptom and medication mgmt.  Generalized anxiety disorder  Comments:  Continue to follow Holli Sanchez NP for symptom and medication mgmt.  Vitamin D deficiency  Comments:  Continue vitamin d.  Living in assisted living  Comments:  Medical and facility chart reviewed, medications reconciled and collaboration with nursing. Engage pt in activities to help with boredom.          Electronically Signed By: CARLO Travis-CNP   7/28/25  2:50 PM       [1]  Current Outpatient Medications on File Prior to Visit   Medication Sig Dispense Refill   • acetaminophen (Tylenol) 325 mg tablet Take 2 tablets (650 mg) by mouth every 6 hours if needed for mild pain (1 - 3).     • aspirin 81 mg EC tablet Take 1 tablet (81 mg) by mouth once daily. 90 tablet 3   • calcium carbonate (Tums) 200 mg calcium chewable tablet Chew 2 tablets (1,000 mg) 4 times a day as needed for indigestion or heartburn (1-2 tablets).     • calcium carbonate-vitamin D3 500 mg-5 mcg (200 unit) tablet Take 1 tablet by mouth 2 times a day. 180 tablet 3   • cholecalciferol (Vitamin D3) 25 mcg (1,000 units) tablet Take 2 tablets (50 mcg) by mouth once daily. 30 tablet 1   • clobetasol (Temovate) 0.05 % cream Apply 1 Application topically 2 times a day.     • cranberry fruit (cranberry) 450 mg tablet Take 1 capsule by mouth once daily. At bedtime     • d-mannose (AZO D-Mannose) 500 mg capsule Take 1 capsule (500 mg) by mouth once daily. At bedtime     • furosemide (Lasix) 20 mg tablet Take 1 tablet (20 mg) by mouth once daily. 90 tablet 3   • hydrOXYzine HCL (Atarax) 25 mg tablet Take 1 tablet (25 mg) by mouth every 8 hours if needed.     • melatonin 5 mg capsule Take 1 capsule (5 mg) by mouth once daily. At bedtime     • memantine (Namenda) 10 mg tablet Take 1 tablet (10 mg) by mouth twice a day.     • multivitamin tablet Take 1 tablet by mouth once daily. 90 tablet 3   • polyethylene  glycol-electrolytes (Nulytely) 420 gram solution Take 4,000 mL by mouth once daily as needed. For constipation     • simvastatin (Zocor) 20 mg tablet Take 1 tablet (20 mg) by mouth once daily at bedtime. 90 tablet 3   • traZODone (Desyrel) 50 mg tablet Take 1 tablet (50 mg) by mouth once daily at bedtime. 90 tablet 1   • venlafaxine (Effexor) 75 mg tablet Take 1 tablet (75 mg) by mouth once daily.     • donepezil (Aricept) 5 mg tablet Take 1 tablet (5 mg) by mouth once daily. After breakfast (Patient not taking: Reported on 7/28/2025)       No current facility-administered medications on file prior to visit.

## 2025-07-28 VITALS
HEART RATE: 71 BPM | RESPIRATION RATE: 16 BRPM | WEIGHT: 140.6 LBS | OXYGEN SATURATION: 95 % | DIASTOLIC BLOOD PRESSURE: 64 MMHG | SYSTOLIC BLOOD PRESSURE: 120 MMHG

## 2025-07-28 RX ORDER — MEMANTINE HYDROCHLORIDE 10 MG/1
10 TABLET ORAL 2 TIMES DAILY
COMMUNITY
Start: 2025-06-20 | End: 2025-12-17

## 2025-07-28 RX ORDER — HYDROXYZINE HYDROCHLORIDE 25 MG/1
25 TABLET, FILM COATED ORAL EVERY 8 HOURS PRN
COMMUNITY
Start: 2025-05-26

## 2025-07-28 ASSESSMENT — ENCOUNTER SYMPTOMS
NAUSEA: 0
HEADACHES: 0
COUGH: 0
NERVOUS/ANXIOUS: 0
WEAKNESS: 0
VOMITING: 0
DIZZINESS: 0
FATIGUE: 0
CONSTIPATION: 0
CHILLS: 0
DIARRHEA: 0
SLEEP DISTURBANCE: 0
FEVER: 0
FREQUENCY: 0
ARTHRALGIAS: 0
RHINORRHEA: 0
SORE THROAT: 0
SHORTNESS OF BREATH: 0

## 2025-07-28 NOTE — PROGRESS NOTES
"Subjective   Patient ID: Gifty Zavala is a 84 y.o. female who is assisted living/ home patient being seen Dorcas Wallace, and evaluated for a Routine AL Visit.     HPI     Pt visited in assisted living. Pt oriented x1, unable to state , denies pain and is comfortable. Pt denies changes in vision, and hearing. Appears to hear normal tone of voice. States appetite is good, and denies difficulty with chewing and swallowing. Pt goes to dining room for all meals. Pt unable to recall what was eaten today for breakfast. Pt denies headaches, dizziness, congestion, cough, indigestion, and runny nose.     Denies chest tenderness and sob at rest/exertion. Pt with trace edema to lower extremities. Pt states \"my legs always have looked like this. I try to keep them up\". Pt not interested in compression stocking. Denies voiding symptoms and constipation. UA was obtained due to nursing noticing increased confusion, results were negative. Pt states \"why do they think I was confused because I stand around? I'm bored that's why I might look confused when I'm standing. There isn't much to do\". Pt ambulates independently, and no recent falls reported. Denies sleep disturbances. Pt follows mental health provider for anxiety/depression.  Pt with no health concerns at this time. HPI limited due to cognition secondary to dementia. Additional collateral information obtained from nursing. TC to daughter to update on visit.     Medications Ordered Prior to Encounter[1]     Review of Systems   Constitutional:  Negative for chills, fatigue and fever.   HENT:  Negative for congestion, rhinorrhea and sore throat.    Respiratory:  Negative for cough and shortness of breath.    Cardiovascular:  Negative for chest pain and leg swelling.   Gastrointestinal:  Negative for constipation, diarrhea, nausea and vomiting.   Genitourinary:  Negative for frequency.   Musculoskeletal:  Negative for arthralgias and gait problem.   Neurological:  Negative for " dizziness, weakness and headaches.   Psychiatric/Behavioral:  Negative for sleep disturbance. The patient is not nervous/anxious.        Objective   /64   Pulse 71   Resp 16   Wt 63.8 kg (140 lb 9.6 oz)   SpO2 95%     Physical Exam  Constitutional:       General: She is awake. She is not in acute distress.     Appearance: Normal appearance. She is not ill-appearing.   HENT:      Head: Normocephalic.      Nose: No congestion or rhinorrhea.      Mouth/Throat:      Mouth: Mucous membranes are moist.     Eyes:      Conjunctiva/sclera: Conjunctivae normal.       Cardiovascular:      Rate and Rhythm: Normal rate and regular rhythm.      Pulses: Normal pulses.      Heart sounds: Normal heart sounds.   Pulmonary:      Effort: Pulmonary effort is normal. No respiratory distress.      Breath sounds: Normal breath sounds. No wheezing or rhonchi.   Abdominal:      General: Bowel sounds are normal.      Palpations: Abdomen is soft.     Musculoskeletal:         General: Normal range of motion.      Cervical back: Normal range of motion.      Right lower leg: Edema present.      Left lower leg: Edema present.      Comments: Trace BLE edema     Skin:     General: Skin is warm.      Capillary Refill: Capillary refill takes less than 2 seconds.     Neurological:      Mental Status: She is alert. Mental status is at baseline.      Comments: Oriented x1   Psychiatric:         Attention and Perception: Attention normal.         Mood and Affect: Mood normal.         Speech: Speech normal.         Behavior: Behavior is cooperative.         Cognition and Memory: Cognition is impaired. Memory is impaired.         Assessment/Plan   Diagnoses and all orders for this visit:  Mixed hyperlipidemia  Comments:  Continue simvastatin.  Vascular dementia without behavioral disturbance, psychotic disturbance, mood disturbance, or anxiety, unspecified dementia severity (Multi)  Comments:  Continue to follow Holli Sanchez NP for symptom and  medication mgmt.  Generalized anxiety disorder  Comments:  Continue to follow Holli Sanchez NP for symptom and medication mgmt.  Vitamin D deficiency  Comments:  Continue vitamin d.  Living in assisted living  Comments:  Medical and facility chart reviewed, medications reconciled and collaboration with nursing. Engage pt in activities to help with boredom.           [1]   Current Outpatient Medications on File Prior to Visit   Medication Sig Dispense Refill    acetaminophen (Tylenol) 325 mg tablet Take 2 tablets (650 mg) by mouth every 6 hours if needed for mild pain (1 - 3).      aspirin 81 mg EC tablet Take 1 tablet (81 mg) by mouth once daily. 90 tablet 3    calcium carbonate (Tums) 200 mg calcium chewable tablet Chew 2 tablets (1,000 mg) 4 times a day as needed for indigestion or heartburn (1-2 tablets).      calcium carbonate-vitamin D3 500 mg-5 mcg (200 unit) tablet Take 1 tablet by mouth 2 times a day. 180 tablet 3    cholecalciferol (Vitamin D3) 25 mcg (1,000 units) tablet Take 2 tablets (50 mcg) by mouth once daily. 30 tablet 1    clobetasol (Temovate) 0.05 % cream Apply 1 Application topically 2 times a day.      cranberry fruit (cranberry) 450 mg tablet Take 1 capsule by mouth once daily. At bedtime      d-mannose (AZO D-Mannose) 500 mg capsule Take 1 capsule (500 mg) by mouth once daily. At bedtime      furosemide (Lasix) 20 mg tablet Take 1 tablet (20 mg) by mouth once daily. 90 tablet 3    hydrOXYzine HCL (Atarax) 25 mg tablet Take 1 tablet (25 mg) by mouth every 8 hours if needed.      melatonin 5 mg capsule Take 1 capsule (5 mg) by mouth once daily. At bedtime      memantine (Namenda) 10 mg tablet Take 1 tablet (10 mg) by mouth twice a day.      multivitamin tablet Take 1 tablet by mouth once daily. 90 tablet 3    polyethylene glycol-electrolytes (Nulytely) 420 gram solution Take 4,000 mL by mouth once daily as needed. For constipation      simvastatin (Zocor) 20 mg tablet Take 1 tablet (20 mg) by  mouth once daily at bedtime. 90 tablet 3    traZODone (Desyrel) 50 mg tablet Take 1 tablet (50 mg) by mouth once daily at bedtime. 90 tablet 1    venlafaxine (Effexor) 75 mg tablet Take 1 tablet (75 mg) by mouth once daily.      donepezil (Aricept) 5 mg tablet Take 1 tablet (5 mg) by mouth once daily. After breakfast (Patient not taking: Reported on 7/28/2025)       No current facility-administered medications on file prior to visit.